# Patient Record
Sex: MALE | Race: WHITE | NOT HISPANIC OR LATINO | Employment: FULL TIME | URBAN - METROPOLITAN AREA
[De-identification: names, ages, dates, MRNs, and addresses within clinical notes are randomized per-mention and may not be internally consistent; named-entity substitution may affect disease eponyms.]

---

## 2019-08-13 ENCOUNTER — OFFICE VISIT (OUTPATIENT)
Dept: CARDIOLOGY | Facility: CLINIC | Age: 19
End: 2019-08-13

## 2019-08-13 VITALS
OXYGEN SATURATION: 98 % | DIASTOLIC BLOOD PRESSURE: 79 MMHG | HEIGHT: 72 IN | HEART RATE: 95 BPM | BODY MASS INDEX: 24.62 KG/M2 | WEIGHT: 181.8 LBS | SYSTOLIC BLOOD PRESSURE: 120 MMHG

## 2019-08-13 DIAGNOSIS — R94.31 ABNORMAL EKG: Primary | ICD-10-CM

## 2019-08-13 PROCEDURE — 93000 ELECTROCARDIOGRAM COMPLETE: CPT | Performed by: INTERNAL MEDICINE

## 2019-08-13 PROCEDURE — 99203 OFFICE O/P NEW LOW 30 MIN: CPT | Performed by: INTERNAL MEDICINE

## 2019-08-13 NOTE — PROGRESS NOTES
"Rosie Clark    History of Present Illness     CC: Abnormal EKG, skipped heartbeats by auscultation..        Mr. Jose Luis Obrien is a very pleasant 19 years old healthy young man  who was referred to our office for \" skipped heartbeats\" and abnormal EKG.  There is no history of hypertension diabetes dyslipidemia congenital heart disease any childhood cardiac issues presyncope or syncope.  Patient in fact denies any palpitations or skipped heartbeats.    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    He is not on any medications.  He uses energy drink on few occasions.    There is no history of illicit drug use.  There is no history of marijuana use.  There is no history of use of cocaine or    Patient is a non-smoker.    There is no history of premature heart disease in the family.  1 of her grandfathers may have had some issues with his heart but nature is unclear.    I have reviewed his EKG from today and showed normal sinus rhythm short IN interval versus ectopic atrial rhythm heart rate of 95 bpm and early repolarization which is a normal variant in this age group.  Blood pressure was 120/79 pulse rate was 98.    Assessment/plan:    1.  Abnormal EKG early repolarization and ectopic atrial rhythm and sinus rhythm are of no clinical concern.  His blood pressure is stable.  I do not see any clear-cut indication at this point to put him on treadmill.    Mr.s Obrien is stable to have CDL clearance from cardiac standpoint with no restrictions.  Amphetamines.          Chief Complaint   Patient presents with   • New patient visit     referred by Amber Ruggiero NP at [facility]    • Abnormal ECG     routine for CDL / heart skipping beats / ekg x 3 taken      /79   Pulse 95 Comment: sinus  Ht 182.9 cm (72\")   Wt 82.5 kg (181 lb 12.8 oz)   SpO2 98%   BMI 24.66 kg/m²   No current outpatient medications on file prior to visit.     No current facility-administered medications on file prior to visit.      The following " portions of the patient's history were reviewed and updated as appropriate: allergies, current medications, past family history, past medical history, past social history, past surgical history and problem list.    Review of Systems   Constitutional: Negative.    HENT: Negative.    Cardiovascular: Negative.    Gastrointestinal: Negative.    Endocrine: Negative.    Genitourinary: Negative for hematuria.   Musculoskeletal: Negative.    Skin: Negative.    Allergic/Immunologic: Negative.    Neurological: Negative.    Psychiatric/Behavioral: Negative.        Objective   Physical Exam   Constitutional: He is oriented to person, place, and time. He appears well-developed and well-nourished.   HENT:   Head: Normocephalic and atraumatic.   Eyes: EOM are normal. Pupils are equal, round, and reactive to light.   Neck: Normal range of motion. Neck supple. No JVD present. No tracheal deviation present. No thyromegaly present.   Cardiovascular: Normal rate, regular rhythm, normal heart sounds and intact distal pulses. Exam reveals no gallop and no friction rub.   No murmur heard.  Pulmonary/Chest: Effort normal and breath sounds normal. He has no wheezes.   Abdominal: Soft. Bowel sounds are normal.   Musculoskeletal: Normal range of motion.   Lymphadenopathy:     He has no cervical adenopathy.   Neurological: He is oriented to person, place, and time.   Skin: Skin is warm and dry.   Psychiatric: He has a normal mood and affect. His behavior is normal.